# Patient Record
Sex: FEMALE | Race: BLACK OR AFRICAN AMERICAN | NOT HISPANIC OR LATINO | Employment: FULL TIME | URBAN - METROPOLITAN AREA
[De-identification: names, ages, dates, MRNs, and addresses within clinical notes are randomized per-mention and may not be internally consistent; named-entity substitution may affect disease eponyms.]

---

## 2022-09-26 ENCOUNTER — TELEPHONE (OUTPATIENT)
Dept: OBSTETRICS AND GYNECOLOGY | Facility: CLINIC | Age: 22
End: 2022-09-26
Payer: MEDICARE

## 2022-09-26 DIAGNOSIS — Z34.90 PREGNANCY: Primary | ICD-10-CM

## 2022-09-28 ENCOUNTER — HOSPITAL ENCOUNTER (EMERGENCY)
Facility: OTHER | Age: 22
Discharge: HOME | End: 2022-09-28
Attending: EMERGENCY MEDICINE
Payer: MEDICARE

## 2022-09-28 VITALS
HEIGHT: 63 IN | SYSTOLIC BLOOD PRESSURE: 112 MMHG | DIASTOLIC BLOOD PRESSURE: 62 MMHG | WEIGHT: 120 LBS | HEART RATE: 82 BPM | TEMPERATURE: 98 F | BODY MASS INDEX: 21.26 KG/M2 | RESPIRATION RATE: 18 BRPM | OXYGEN SATURATION: 100 %

## 2022-09-28 DIAGNOSIS — O21.9 NAUSEA AND VOMITING IN PREGNANCY PRIOR TO 22 WEEKS GESTATION: ICD-10-CM

## 2022-09-28 DIAGNOSIS — Z3A.01 LESS THAN 8 WEEKS GESTATION OF PREGNANCY: Primary | ICD-10-CM

## 2022-09-28 DIAGNOSIS — B96.89 BV (BACTERIAL VAGINOSIS): ICD-10-CM

## 2022-09-28 DIAGNOSIS — B37.31 VAGINITIS DUE TO CANDIDA: ICD-10-CM

## 2022-09-28 DIAGNOSIS — N76.0 BV (BACTERIAL VAGINOSIS): ICD-10-CM

## 2022-09-28 LAB
ABO + RH BLD: NORMAL
ALBUMIN SERPL BCP-MCNC: 4 G/DL (ref 3.5–5.2)
ALP SERPL-CCNC: 26 U/L (ref 55–135)
ALT SERPL W/O P-5'-P-CCNC: 10 U/L (ref 10–44)
ANION GAP SERPL CALC-SCNC: 7 MMOL/L (ref 8–16)
AST SERPL-CCNC: 14 U/L (ref 10–40)
B-HCG UR QL: POSITIVE
BACTERIA GENITAL QL WET PREP: ABNORMAL
BASOPHILS # BLD AUTO: 0.04 K/UL (ref 0–0.2)
BASOPHILS NFR BLD: 0.6 % (ref 0–1.9)
BILIRUB SERPL-MCNC: 0.7 MG/DL (ref 0.1–1)
BUN SERPL-MCNC: 7 MG/DL (ref 6–20)
CALCIUM SERPL-MCNC: 9.8 MG/DL (ref 8.7–10.5)
CHLORIDE SERPL-SCNC: 105 MMOL/L (ref 95–110)
CLUE CELLS VAG QL WET PREP: ABNORMAL
CO2 SERPL-SCNC: 25 MMOL/L (ref 23–29)
CREAT SERPL-MCNC: 0.7 MG/DL (ref 0.5–1.4)
CTP QC/QA: YES
DIFFERENTIAL METHOD: ABNORMAL
EOSINOPHIL # BLD AUTO: 0.1 K/UL (ref 0–0.5)
EOSINOPHIL NFR BLD: 1 % (ref 0–8)
ERYTHROCYTE [DISTWIDTH] IN BLOOD BY AUTOMATED COUNT: 11.9 % (ref 11.5–14.5)
EST. GFR  (NO RACE VARIABLE): >60 ML/MIN/1.73 M^2
FILAMENT FUNGI VAG WET PREP-#/AREA: ABNORMAL
GLUCOSE SERPL-MCNC: 98 MG/DL (ref 70–110)
HCG INTACT+B SERPL-ACNC: NORMAL MIU/ML
HCT VFR BLD AUTO: 36.5 % (ref 37–48.5)
HGB BLD-MCNC: 12.8 G/DL (ref 12–16)
IMM GRANULOCYTES # BLD AUTO: 0.01 K/UL (ref 0–0.04)
IMM GRANULOCYTES NFR BLD AUTO: 0.2 % (ref 0–0.5)
LYMPHOCYTES # BLD AUTO: 1.9 K/UL (ref 1–4.8)
LYMPHOCYTES NFR BLD: 30.4 % (ref 18–48)
MCH RBC QN AUTO: 31.4 PG (ref 27–31)
MCHC RBC AUTO-ENTMCNC: 35.1 G/DL (ref 32–36)
MCV RBC AUTO: 90 FL (ref 82–98)
MONOCYTES # BLD AUTO: 0.4 K/UL (ref 0.3–1)
MONOCYTES NFR BLD: 6.3 % (ref 4–15)
NEUTROPHILS # BLD AUTO: 3.8 K/UL (ref 1.8–7.7)
NEUTROPHILS NFR BLD: 61.5 % (ref 38–73)
NRBC BLD-RTO: 0 /100 WBC
PLATELET # BLD AUTO: 201 K/UL (ref 150–450)
PLATELET BLD QL SMEAR: ABNORMAL
PMV BLD AUTO: 10.4 FL (ref 9.2–12.9)
POTASSIUM SERPL-SCNC: 3.5 MMOL/L (ref 3.5–5.1)
PROT SERPL-MCNC: 7.3 G/DL (ref 6–8.4)
RBC # BLD AUTO: 4.08 M/UL (ref 4–5.4)
SODIUM SERPL-SCNC: 137 MMOL/L (ref 136–145)
SPECIMEN SOURCE: ABNORMAL
T VAGINALIS GENITAL QL WET PREP: ABNORMAL
WBC # BLD AUTO: 6.22 K/UL (ref 3.9–12.7)
WBC #/AREA VAG WET PREP: ABNORMAL
YEAST GENITAL QL WET PREP: ABNORMAL

## 2022-09-28 PROCEDURE — 84702 CHORIONIC GONADOTROPIN TEST: CPT | Performed by: NURSE PRACTITIONER

## 2022-09-28 PROCEDURE — 87591 N.GONORRHOEAE DNA AMP PROB: CPT | Performed by: NURSE PRACTITIONER

## 2022-09-28 PROCEDURE — 99285 EMERGENCY DEPT VISIT HI MDM: CPT | Mod: 25

## 2022-09-28 PROCEDURE — 80053 COMPREHEN METABOLIC PANEL: CPT | Performed by: NURSE PRACTITIONER

## 2022-09-28 PROCEDURE — 86901 BLOOD TYPING SEROLOGIC RH(D): CPT | Performed by: NURSE PRACTITIONER

## 2022-09-28 PROCEDURE — 25000003 PHARM REV CODE 250: Performed by: NURSE PRACTITIONER

## 2022-09-28 PROCEDURE — 87491 CHLMYD TRACH DNA AMP PROBE: CPT | Performed by: NURSE PRACTITIONER

## 2022-09-28 PROCEDURE — 85025 COMPLETE CBC W/AUTO DIFF WBC: CPT | Performed by: NURSE PRACTITIONER

## 2022-09-28 PROCEDURE — 81025 URINE PREGNANCY TEST: CPT | Performed by: EMERGENCY MEDICINE

## 2022-09-28 PROCEDURE — 87210 SMEAR WET MOUNT SALINE/INK: CPT | Performed by: NURSE PRACTITIONER

## 2022-09-28 RX ORDER — ONDANSETRON 4 MG/1
4 TABLET, ORALLY DISINTEGRATING ORAL EVERY 8 HOURS PRN
Qty: 30 TABLET | Refills: 0 | Status: SHIPPED | OUTPATIENT
Start: 2022-09-28

## 2022-09-28 RX ORDER — ONDANSETRON 2 MG/ML
4 INJECTION INTRAMUSCULAR; INTRAVENOUS
Status: DISCONTINUED | OUTPATIENT
Start: 2022-09-28 | End: 2022-09-28 | Stop reason: HOSPADM

## 2022-09-28 RX ORDER — METRONIDAZOLE 7.5 MG/G
1 GEL VAGINAL NIGHTLY
Qty: 5 APPLICATOR | Refills: 0 | Status: SHIPPED | OUTPATIENT
Start: 2022-09-28 | End: 2022-10-03

## 2022-09-28 RX ORDER — FAMOTIDINE 20 MG
1 TABLET ORAL DAILY
Qty: 30 TABLET | Refills: 8 | Status: SHIPPED | OUTPATIENT
Start: 2022-09-28 | End: 2023-09-28

## 2022-09-28 RX ADMIN — SODIUM CHLORIDE 1000 ML: 0.9 INJECTION, SOLUTION INTRAVENOUS at 05:09

## 2022-09-28 NOTE — ED PROVIDER NOTES
Source of History:  Patient    Chief complaint:  +home preg test (Pt c.o nausea and vomiting and feeling bad yesterday.  Pt had vaginal bleeding 3 days ago. Pt has not had US or obgyn appt. Pt has appt scheduled.  AAO x 3 nadn skin w.d   pt also c.o nasal congestion onset 1 week )      HPI:  Dayday Hollingsworth is a 22 y.o. female A1 presenting with vaginal spotting, nausea and vomiting in early pregnancy.  LMP 2022.  Patient states that 3 days ago she had light vaginal spotting.  Initially she states that was just a streak on her panty liner but later in the day she had blood in her underwear.  She denies vaginal spotting currently but would like to know if the baby is okay.  She also reports nausea for the past week but vomiting that started last night.  She states she is unable to keep down food or water.  She denies fever, chills, abdominal pain, dysuria, abnormal vaginal discharge, flank pain and back pain at this time.  She also complains of nasal congestion for the past week.  She states today is the 1st day that she is starting to feel better.  She denies known contact with COVID positive/sick individual.    This is the extent to the patients complaints today here in the emergency department.    ROS: As per HPI and below:  Constitutional: No weight loss  ENT: Denies hearing loss or ear pain no throat pain  Eyes: No visual loss or changes  Cardiovascular: No chest pain or palpitations  Respiratory: No shortness of breath or cough  GI:  No abdominal pain.  Positive nausea and vomiting  : +vaginal spotting -vaginal discharge  Skin: No rashes or lesions  Musculoskeletal: No arthralgias or myalgias  Heme/Onc: No fevers or chills  Neuro: No loss of consciousness headache or dizziness    Review of patient's allergies indicates:  No Known Allergies    PMH:  As per HPI and below:  History reviewed. No pertinent past medical history.  History reviewed. No pertinent surgical history.         Physical Exam:   "  /62 (BP Location: Left arm, Patient Position: Lying)   Pulse 82   Temp 98 °F (36.7 °C) (Oral)   Resp 18   Ht 5' 3" (1.6 m)   Wt 54.4 kg (120 lb)   SpO2 100%   Breastfeeding No   BMI 21.26 kg/m²   Nurse's notes vitals reviewed  General: Patient alert and oriented,well-developed, appears well-nourished  ENT: Oropharynx nonerythematous, oral mucosa moist,   Eyes: Pupils equally round and reactive to light, extraocular muscles are intact, normal conjunctiva, normal sclera  Cardiovascular: Regular rate and rhythm no murmurs gallops or rubs  Respiratory: Clear to auscultation bilaterally without wheezing rhonchi or rales  GI: Soft nontender nondistended bowel sounds normal, no guarding, no rebound, non peritoneal  :  Exam done in presence of chaperone Shabbir PCT.  External genitalia without lesions mass or tenderness.  Copious amount of white chunky discharge lining the vaginal walls and around the cervix resembling candida.  Vaginal mucosa appears irritated.  Also milky white discharge noted.  Os closed.  No blood in vaginal vault. No CMT or adnexal tenderness on bimanual exam.  Musculoskeletal: Normocephalic atraumatic  Full strength and normal range of motion, no obvious deformities, no edema, normal cap refill  Skin: Warm and dry no rashes or lesions, no ecchymosis, no erythema  Neuro: Alert and oriented x3,  normal sensory, normal motor, no cerebellar deficits. Ambulates with a steady gait    Labs that have been ordered have been independently reviewed and interpreted by myself.        Initial Impression/ Differential Dx:  Urgent evaluation of female 22 y.o. presenting with vaginal spotting, nausea and vomiting in early pregnancy. Patient is afebrile, not toxic appearing and hemodynamically stable.  Abdomen is soft and nontender.  Plan for vaginal bleed workup.    Differential Diagnosis includes, but is not limited to:  Pregnancy complication (threatened AB, inevitable AB, incomplete Ab, missed AB, " uterine rupture, ectopic pregnancy, placental abruption, placenta previa), ovarian cyst/torsion, STD, foreign body, trauma, normal menstruation.      MDM:         ED Course as of 09/28/22 1955   Wed Sep 28, 2022   1757 Preg Test, Ur(!): Positive [CU]   1826 Comp. Metabolic Panel(!)  No significant electrolyte abnormalities, normal kidney function, no elevation of LFTs [CU]   1826 Trichomonas: None [CU]   1826 Clue Cells, Wet Prep(!): Occasional  Will treat with intravaginal cream [CU]   1826 Budding Yeast(!): Moderate [CU]   1826 Fungal Hyphae(!): Few  Will treat [CU]   1836 Group & Rh: O POS [CU]   1858 HCG Quant: [id number] [CU]   1915 US OB <14 Wks TransAbd & TransVag, Single Gestation (XPD)  Early live IUP with crown-rump length measurements corresponding to a gestational age of 6 weeks 4 days with an estimated ultrasound due date of 05/20/2023 [CU]   1925 WBC: 6.22 [CU]   1925 Hemoglobin: 12.8 [CU]   1925 Hematocrit(!): 36.5 [CU]   1951 At bedside to Reassess patient and inform her of her lab and imaging results.  Will treat her with intravaginal creams for both BV and Candida.  Patient given Zofran prescription for pregnancy related nausea.  Instructed patient to start taking prenatal vitamins and to establish obstetric care, referral placed. Patient educated on signs and symptoms to monitor for and when to return to ED. Patient verbalized understanding agrees with treatment plan. All questions and concerns addressed.      [CU]      ED Course User Index  [CU] Pinky Alonso NP               Diagnostic Impression:    1. Less than 8 weeks gestation of pregnancy    2. BV (bacterial vaginosis)    3. Vaginitis due to Candida    4. Nausea and vomiting in pregnancy prior to 22 weeks gestation         ED Disposition Condition    Discharge Good            ED Prescriptions       Medication Sig Dispense Start Date End Date Auth. Provider    metroNIDAZOLE (METROGEL) 0.75 % (37.5mg/5 gram) vaginal gel Place 1 applicator  vaginally every evening. for 5 days 5 applicator 9/28/2022 10/3/2022 Pinky Alonso NP    miconazole nitrate 200 mg/5 gram (4 %) Crea Place 1 application vaginally every evening. for 7 days 25 g 9/28/2022 10/5/2022 Pinky Alonso NP    prenatal 21-iron fu-folic acid (PRENATAL COMPLETE) 14 mg iron- 400 mcg Tab Take 1 tablet by mouth once daily. 30 tablet 9/28/2022 9/28/2023 Pikny Alonso NP    ondansetron (ZOFRAN-ODT) 4 MG TbDL Take 1 tablet (4 mg total) by mouth every 8 (eight) hours as needed. 30 tablet 9/28/2022 -- Pinky Alonso NP          Follow-up Information       Follow up With Specialties Details Why Contact Info    [facility]  Establish obstetric care [address] [facility] [address]  [facility] [id number] [phone]             Pinky Alonso NP  09/28/22 1955

## 2022-09-28 NOTE — ED TRIAGE NOTES
Chief Complaint   Patient presents with    +home preg test     Pt c.o nausea and vomiting and feeling bad yesterday.  Pt had vaginal bleeding 3 days ago. Pt has not had US or obgyn appt. Pt has appt scheduled.  AAO x 3 nadn skin w.d   pt also c.o nasal congestion onset 1 week      Pt presents to ED with c/o nausea, vomiting, approx 5 weeks OB. Pt had vag bleeding x3 days. Pt hasn't seen OB. Secondary c/o nasal congestion. AAOX4 and partner at the bedside.

## 2022-09-28 NOTE — Clinical Note
"Dayday Hodge" Darrick was seen and treated in our emergency department on 9/28/2022.  She may return to work on 09/30/2022.       If you have any questions or concerns, please don't hesitate to call.      Pinky Alonso NP"

## 2022-09-28 NOTE — Clinical Note
"Dayday Hodge" Darrick was seen and treated in our emergency department on 9/28/2022.  She may return to work on 09/29/2022.       If you have any questions or concerns, please don't hesitate to call.      Pinky Alonso NP"

## 2022-09-29 LAB
C TRACH DNA SPEC QL NAA+PROBE: NOT DETECTED
N GONORRHOEA DNA SPEC QL NAA+PROBE: NOT DETECTED

## 2022-09-30 ENCOUNTER — NURSE TRIAGE (OUTPATIENT)
Dept: ADMINISTRATIVE | Facility: CLINIC | Age: 22
End: 2022-09-30
Payer: MEDICARE

## 2022-09-30 NOTE — TELEPHONE ENCOUNTER
Reason for Disposition   Caller has medication question about med NOT prescribed by PCP and triager unable to answer question (e.g., compatibility with other med, storage)    Additional Information   Negative: Intentional drug overdose and suicidal thoughts or ideas   Negative: Drug overdose and triager unable to answer question   Negative: Caller requesting a renewal or refill of a medicine patient is currently taking   Negative: Caller requesting information unrelated to medicine   Negative: Caller requesting information about COVID-19 Vaccine   Negative: Caller requesting information about Emergency Contraception   Negative: Caller requesting information about Combined Birth Control Pills   Negative: Caller requesting information about Progestin Birth Control Pills   Negative: Caller requesting information about Post-Op pain or medicines   Negative: Caller requesting a prescription antibiotic (such as penicillin) for Strep throat and has a positive culture result   Negative: Caller requesting a prescription anti-viral med (such as Tamiflu) and has influenza (flu) symptoms   Negative: Immunization reaction suspected   Negative: Rash while taking a medicine or within 3 days of stopping it   Negative: Asthma and having symptoms of asthma (cough, wheezing, etc.)   Negative: Symptom of illness (e.g., headache, abdominal pain, earache, vomiting) that are more than mild   Negative: Breastfeeding questions about mother's medicines and diet   Negative: MORE THAN A DOUBLE DOSE of a prescription or over-the-counter (OTC) drug   Negative: DOUBLE DOSE (an extra dose or lesser amount) of prescription drug and any symptoms (e.g., dizziness, nausea, pain, sleepiness)   Negative: DOUBLE DOSE (an extra dose or lesser amount) of over-the-counter (OTC) drug and any symptoms (e.g., dizziness, nausea, pain, sleepiness)   Negative: Took another person's prescription drug   Negative: DOUBLE DOSE (an extra dose or lesser amount) of  prescription drug and NO symptoms  (Exception: A double dose of antibiotics.)   Negative: Diabetes drug error or overdose (e.g., took wrong type of insulin or took extra dose)    Protocols used: Medication Question Call-[facility]  pt called re ED a few days ago and didn't get all meds. Pt states she is missing the vag cream. LM on [facility] Mun[facility] pharm VM at 133pm.

## 2022-10-14 ENCOUNTER — TELEPHONE (OUTPATIENT)
Dept: OBSTETRICS AND GYNECOLOGY | Facility: CLINIC | Age: 22
End: 2022-10-14
Payer: MEDICARE

## 2022-10-17 ENCOUNTER — TELEPHONE (OUTPATIENT)
Dept: OBSTETRICS AND GYNECOLOGY | Facility: CLINIC | Age: 22
End: 2022-10-17
Payer: MEDICARE

## 2023-06-20 ENCOUNTER — PATIENT MESSAGE (OUTPATIENT)
Dept: RESEARCH | Facility: HOSPITAL | Age: 23
End: 2023-06-20
Payer: MEDICARE